# Patient Record
Sex: MALE | Race: WHITE | HISPANIC OR LATINO
[De-identification: names, ages, dates, MRNs, and addresses within clinical notes are randomized per-mention and may not be internally consistent; named-entity substitution may affect disease eponyms.]

---

## 2021-05-11 PROBLEM — Z00.00 ENCOUNTER FOR PREVENTIVE HEALTH EXAMINATION: Status: ACTIVE | Noted: 2021-05-11

## 2021-05-17 ENCOUNTER — APPOINTMENT (OUTPATIENT)
Dept: ORTHOPEDIC SURGERY | Facility: CLINIC | Age: 51
End: 2021-05-17
Payer: MEDICAID

## 2021-05-17 VITALS — TEMPERATURE: 98.1 F | WEIGHT: 256 LBS | BODY MASS INDEX: 35.84 KG/M2 | HEIGHT: 71 IN

## 2021-05-17 DIAGNOSIS — Z78.9 OTHER SPECIFIED HEALTH STATUS: ICD-10-CM

## 2021-05-17 DIAGNOSIS — Z86.79 PERSONAL HISTORY OF OTHER DISEASES OF THE CIRCULATORY SYSTEM: ICD-10-CM

## 2021-05-17 PROCEDURE — 73110 X-RAY EXAM OF WRIST: CPT | Mod: RT

## 2021-05-17 PROCEDURE — 99204 OFFICE O/P NEW MOD 45 MIN: CPT

## 2021-05-17 PROCEDURE — 73080 X-RAY EXAM OF ELBOW: CPT | Mod: LT

## 2021-05-17 PROCEDURE — 99072 ADDL SUPL MATRL&STAF TM PHE: CPT

## 2021-05-17 RX ORDER — HYDROCHLOROTHIAZIDE 25 MG/1
25 TABLET ORAL
Refills: 0 | Status: ACTIVE | COMMUNITY

## 2021-05-17 RX ORDER — AMLODIPINE BESYLATE 10 MG/1
10 TABLET ORAL
Refills: 0 | Status: ACTIVE | COMMUNITY

## 2021-05-17 RX ORDER — HYDROCHLOROTHIAZIDE 25 MG/1
25 TABLET ORAL
Qty: 30 | Refills: 0 | Status: ACTIVE | COMMUNITY
Start: 2021-04-21

## 2021-05-17 RX ORDER — CEPHALEXIN 500 MG/1
500 CAPSULE ORAL
Qty: 14 | Refills: 0 | Status: ACTIVE | COMMUNITY
Start: 2020-11-27

## 2021-05-17 RX ORDER — AMLODIPINE BESYLATE 10 MG/1
10 TABLET ORAL
Qty: 30 | Refills: 0 | Status: ACTIVE | COMMUNITY
Start: 2021-04-21
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
## 2021-05-17 NOTE — PHYSICAL EXAM
[de-identified] : -Constitutional: This is a male in not obvious distress.\par -Psych: Patient is alert and oriented to person, place and time. Patient has a normal mood and affect.\par -Cardiovascular: Normal pulses throughout the upper extremities. No significant varicosities are noted in the upper extremities.\par -Neuro: Strength and sensation are intact throughout the upper extremities. Patient has normal coordination.\par -Respiratory: Patient exhibits no evidence of shortness of breath or difficulty breathing.\par -Skin: No rashes, lesions, or other abnormalities are noted in the upper extremities.\par \par ---\par \par Examination of his right wrist demonstrates no obvious swelling.  Is tender dorsally over the scapholunate interval.  There are no palpable masses or ganglion cyst.  He has negative Campos sign.  He does have pain with terminal flexion and extension limitation.  He has full flexion and extension of the digits.  His complaints of numbness and tingling in the fingers.  Provocative signs for carpal tunnel syndrome are negative.  He has normal sensation to light touch distally along the radial, ulnar and median nerve distributions.\par \par Examination of his left elbow demonstrates mild thickening of the olecranon bursa with mild tenderness.  There is no obvious fluid.  There is no tenderness along the medial or lateral epicondyles.  He has full motion.  He is neurovascularly intact distally. [de-identified] : PA, lateral, PA  and PA ulnar deviation views of his right wrist demonstrate no obvious fractures or dislocations.  There is possibly minimal widening of the scapholunate interval on the PA  view.\par \par AP, lateral oblique radiographs of his left elbow demonstrate no obvious fractures.  There is a small olecranon spur.

## 2021-05-17 NOTE — DISCUSSION/SUMMARY
[FreeTextEntry1] : He has findings consistent with persistent right dorsal wrist pain, status post an injury 5 months ago.  He also has complaints consistent with possible right carpal tunnel syndrome.  Finally, he has mild left elbow pain consistent with olecranon bursitis with a small spur noted on radiographs.\par \par I had a discussion regarding today's visit, the prognosis of this diagnosis and treatment recommendations and options.\par \par With regard to his right wrist injury, I recommended an MRI to evaluate his right wrist injury and to rule out a scapholunate ligament injury.  I also ordered EMGs to better evaluate his numbness and tingling.  He will follow-up after the studies to review the results and discuss treatment recommendations.\par \par With regard to his left elbow, I recommended he avoid leaning on the elbow and symptomatic treatment was discussed.\par \par The patient has agreed to this plan of management and has expressed full understanding.  All questions were fully answered to the patient's satisfaction.\par \par My cumulative time spent on this patient's visit included: Preparation for the visit, review of the medical records, review of pertinent diagnostic studies, examination and counseling of the patient on the above diagnosis, treatment plan and prognosis, orders of diagnostic tests, medications and/ or appropriate procedures and documentation in the medical records of today's visit.

## 2021-05-17 NOTE — ADDENDUM
[FreeTextEntry1] : This note was written by Darya Lopez on 05/17/2021 acting solely as a scribe for Dr. Chepe Starr.\par \par All medical record entries made by the scribe were at my, Dr. Chepe Starr, direction and personally dictated by me on 05/17/2021. I have personally reviewed the chart and agree that the record accurately reflects my personal performance of the history, physical exam, assessment, and plan

## 2021-06-01 ENCOUNTER — APPOINTMENT (OUTPATIENT)
Dept: RADIOLOGY | Facility: CLINIC | Age: 51
End: 2021-06-01
Payer: MEDICAID

## 2021-06-01 ENCOUNTER — RESULT REVIEW (OUTPATIENT)
Age: 51
End: 2021-06-01

## 2021-06-01 ENCOUNTER — APPOINTMENT (OUTPATIENT)
Dept: MRI IMAGING | Facility: CLINIC | Age: 51
End: 2021-06-01

## 2021-06-01 DIAGNOSIS — Z18.10 RETAINED METAL FRAGMENTS, UNSPECIFIED: ICD-10-CM

## 2021-06-01 PROCEDURE — 71046 X-RAY EXAM CHEST 2 VIEWS: CPT

## 2021-06-01 PROCEDURE — 74019 RADEX ABDOMEN 2 VIEWS: CPT

## 2021-06-02 ENCOUNTER — NON-APPOINTMENT (OUTPATIENT)
Age: 51
End: 2021-06-02

## 2021-06-18 ENCOUNTER — NON-APPOINTMENT (OUTPATIENT)
Age: 51
End: 2021-06-18

## 2021-06-28 ENCOUNTER — APPOINTMENT (OUTPATIENT)
Dept: ORTHOPEDIC SURGERY | Facility: CLINIC | Age: 51
End: 2021-06-28
Payer: MEDICAID

## 2021-06-28 VITALS — BODY MASS INDEX: 35.84 KG/M2 | HEIGHT: 71 IN | TEMPERATURE: 97.6 F | WEIGHT: 256 LBS

## 2021-06-28 PROCEDURE — 99072 ADDL SUPL MATRL&STAF TM PHE: CPT

## 2021-06-28 PROCEDURE — 99214 OFFICE O/P EST MOD 30 MIN: CPT

## 2021-06-28 NOTE — PHYSICAL EXAM
[de-identified] : -Constitutional: This is a male in not obvious distress.\par -Psych: Patient is alert and oriented to person, place and time. Patient has a normal mood and affect.\par -Cardiovascular: Normal pulses throughout the upper extremities. No significant varicosities are noted in the upper extremities.\par -Neuro: Strength and sensation are intact throughout the upper extremities. Patient has normal coordination.\par -Respiratory: Patient exhibits no evidence of shortness of breath or difficulty breathing.\par -Skin: No rashes, lesions, or other abnormalities are noted in the upper extremities.\par \par ---\par \par Examination of his right wrist demonstrates no obvious swelling.  He remains tender dorsally over the scapholunate interval.  There are no palpable masses or ganglion cyst.  He has negative Campos sign.  He does have pain with terminal flexion and extension limitation.  He has full flexion and extension of the digits.  His complaints of numbness and tingling in the fingers.  Provocative signs for carpal tunnel syndrome are negative.  He has normal sensation to light touch distally along the radial, ulnar and median nerve distributions.\par \par Examination of his left elbow demonstrates mild thickening of the olecranon bursa with mild tenderness.  There is no obvious fluid.  There is no tenderness along the medial or lateral epicondyles.  He does have a positive Tinel along the ulnar nerve at the cubital tunnel.  He has full motion.  Provocative signs for carpal tunnel syndrome are negative.  He has normal sensation bilaterally along the radial, ulnar and median nerve distributions distally. [de-identified] : Previous PA, lateral, PA  and PA ulnar deviation views of his right wrist demonstrated no obvious fractures or dislocations.  There is possibly minimal widening of the scapholunate interval on the PA  view.\par \par Previous AP, lateral oblique radiographs of his left elbow demonstrated no obvious fractures.  There is a small olecranon spur.
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
## 2021-06-28 NOTE — ADDENDUM
[FreeTextEntry1] : This note was written by Darya Lopez on 06/28/2021 acting solely as a scribe for Dr. Chepe Starr.\par \par All medical record entries made by the scribe were at my, Dr. Chepe Starr, direction and personally dictated by me on 06/28/2021. I have personally reviewed the chart and agree that the record accurately reflects my personal performance of the history, physical exam, assessment, and plan

## 2021-07-19 ENCOUNTER — OUTPATIENT (OUTPATIENT)
Dept: OUTPATIENT SERVICES | Facility: HOSPITAL | Age: 51
LOS: 1 days | End: 2021-07-19
Payer: MEDICAID

## 2021-07-19 PROCEDURE — 93010 ELECTROCARDIOGRAM REPORT: CPT

## 2021-07-26 ENCOUNTER — OUTPATIENT (OUTPATIENT)
Dept: OUTPATIENT SERVICES | Facility: HOSPITAL | Age: 51
LOS: 1 days | End: 2021-07-26

## 2021-07-29 ENCOUNTER — APPOINTMENT (OUTPATIENT)
Dept: ORTHOPEDIC SURGERY | Facility: CLINIC | Age: 51
End: 2021-07-29
Payer: MEDICAID

## 2021-07-29 VITALS — WEIGHT: 256 LBS | TEMPERATURE: 98.2 F | BODY MASS INDEX: 35.84 KG/M2 | HEIGHT: 71 IN

## 2021-07-29 PROCEDURE — 99072 ADDL SUPL MATRL&STAF TM PHE: CPT

## 2021-07-29 PROCEDURE — 99214 OFFICE O/P EST MOD 30 MIN: CPT
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
## 2021-07-29 NOTE — DISCUSSION/SUMMARY
[FreeTextEntry1] :  I had a discussion regarding today's visit, the diagnosis and treatment recommendations and options.  We also discussed changes since the last visit.\par \par At this time, I have also ordered an MRI of his left elbow to better evaluate his increasing pain.  He will follow-up after the MRI of his left elbow and right wrist to discuss treatment recommendations.\par \par The patient has agreed to the above plan of management and has expressed full understanding.  All questions were fully answered to the patient's satisfaction.\par \par My cumulative time spent on today's visit was greater than 30 minutes and included: Preparation for the visit, review of the medical records, review of pertinent diagnostic studies, examination and counseling of the patient on the above diagnosis, treatment plan and prognosis, orders of diagnostic tests, medications and/or appropriate procedures and documentation in the medical records of today's visit.

## 2021-07-29 NOTE — PHYSICAL EXAM
[de-identified] : -Constitutional: This is a male in not obvious distress.\par -Psych: Patient is alert and oriented to person, place and time. Patient has a normal mood and affect.\par -Cardiovascular: Normal pulses throughout the upper extremities. No significant varicosities are noted in the upper extremities.\par -Neuro: Patient has normal coordination.\par -Respiratory: Patient exhibits no evidence of shortness of breath or difficulty breathing.\par -Skin: No rashes, lesions, or other abnormalities are noted in the upper extremities.\par \par ---\par \par Examination of his right wrist demonstrates no obvious swelling.  He remains tender dorsally over the scapholunate interval.  There are no palpable masses or ganglion cyst.  He has negative Campos sign.  He does have pain with terminal flexion and extension limitation.  He has full flexion and extension of the digits.  His complaints of numbness and tingling in the fingers.  Provocative signs for carpal tunnel syndrome are negative.  He has normal sensation to light touch distally along the radial, ulnar and median nerve distributions.\par \par Examination of his left elbow demonstrates mild thickening of the olecranon bursa with increased tenderness.  There is no obvious fluid.  There is no tenderness along the medial or lateral epicondyles.  He has a positive Tinel along the ulnar nerve at the cubital tunnel.  He has full motion.  Provocative signs for carpal tunnel syndrome are negative.  He was noted today to have decreased sensation to light touch along the ulnar nerve distribution with normal sensation median and radial nerve distributions. [de-identified] : Previous PA, lateral, PA  and PA ulnar deviation views of his right wrist demonstrated no obvious fractures or dislocations.  There is possibly minimal widening of the scapholunate interval on the PA  view.\par \par Previous AP, lateral oblique radiographs of his left elbow demonstrated no obvious fractures.  There is a small olecranon spur.

## 2021-08-11 ENCOUNTER — APPOINTMENT (OUTPATIENT)
Dept: MRI IMAGING | Facility: CLINIC | Age: 51
End: 2021-08-11
Payer: MEDICAID

## 2021-08-11 ENCOUNTER — RESULT REVIEW (OUTPATIENT)
Age: 51
End: 2021-08-11

## 2021-08-11 PROCEDURE — 73221 MRI JOINT UPR EXTREM W/O DYE: CPT | Mod: LT

## 2021-08-19 ENCOUNTER — APPOINTMENT (OUTPATIENT)
Dept: ORTHOPEDIC SURGERY | Facility: CLINIC | Age: 51
End: 2021-08-19
Payer: MEDICAID

## 2021-08-19 VITALS — BODY MASS INDEX: 35.84 KG/M2 | HEIGHT: 71 IN | WEIGHT: 256 LBS | TEMPERATURE: 97.5 F

## 2021-08-19 PROCEDURE — 99214 OFFICE O/P EST MOD 30 MIN: CPT

## 2021-08-19 NOTE — DISCUSSION/SUMMARY
[FreeTextEntry1] : I reviewed the MRI results with him.  I had a discussion regarding today's visit, the diagnosis and treatment recommendations and options.  We also discussed changes since the last visit.  Given his symptoms, he would like to go ahead and schedule surgery on the left elbow.  I have recommended left ulnar nerve release at the cubital tunnel as well as olecranon bursectomy, as he is bothered by the bursal sac when he leans on the elbow.  These can both be performed at the same time.  Although the EMGs demonstrated mild right carpal tunnel syndrome, he denies numbness or tingling along the median nerve distribution and provocative signs for carpal tunnel syndrome are negative.  I therefore did not recommend releasing the carpal tunnel at the same time.\par \par Finally, with regard to the right wrist, he is awaiting authorization of the MRI by his insurance company.\par \par -  The nature and purposes of cubital tunnel release surgery was discussed with the patient in detail.  I also discussed olecranon bursectomy.  I discussed that I will be performing an in-situ ulnar nerve release at the cubital tunnel.  We discussed the surgical procedure in detail, as well as expected postoperative recovery and outcome.  The patient understands that if the ulnar nerve is found to be unstable after in situ release, then an anterior subcutaneous transposition may be necessary.  The patient understands that if this is necessary, then the procedure will be more involved, there will be a larger incision, and the expected recovery time is prolonged.\par -  Possible risks, benefits and complications (from known and unknown causes) of the procedure were discussed in detail.  \par -  Possible non-operative alternatives to the proposed treatment were discussed in detail.  \par -  He was told that possible risks/complications include, but are not limited to:  Infection, nerve or vessel injury, possibly hematoma into the bursal sac which may need to be drained, stiffness, painful scar, poor outcome, need for additional surgical procedures, and other unforeseen complications.  \par -  In addition, the possibility of an "unsuccessful outcome," despite "successful surgery," was discussed with the patient.  The patient understands that nerve recovery after surgical release can be unpredictable, and there are no "guarantees" that the preoperative symptoms will completely resolve.  This is particular true in his case, as his EMGs demonstrated severe left cubital tunnel syndrome.\par -  The patient fully understands these risks and wishes to proceed.  \par -  I had a lengthy discussion with the patient regarding today's visit, the diagnosis, and my surgical treatment recommendations.  The patient has agreed to this plan of management and has expressed full understanding.  All questions were fully answered to the patient's satisfaction.\par \par My cumulative time spent on today's visit was greater than 30 minutes and included: Preparation for the visit, review of the medical records, review of pertinent diagnostic studies, examination and counseling of the patient on the above diagnosis, treatment plan and prognosis, orders of diagnostic tests, medications and/or appropriate procedures and documentation in the medical records of today's visit.

## 2021-08-19 NOTE — PHYSICAL EXAM
[de-identified] : -Constitutional: This is a male in not obvious distress.\par -Psych: Patient is alert and oriented to person, place and time. Patient has a normal mood and affect.\par -Cardiovascular: Normal pulses throughout the upper extremities. No significant varicosities are noted in the upper extremities.\par -Neuro: Patient has normal coordination.\par -Respiratory: Patient exhibits no evidence of shortness of breath or difficulty breathing.\par -Skin: No rashes, lesions, or other abnormalities are noted in the upper extremities.\par \par ---\par \par Examination of his right wrist demonstrates no obvious swelling.  He remains tender dorsally over the scapholunate interval.  There are no palpable masses or ganglion cyst.  He has negative Campos sign.  He does have pain with terminal flexion and extension limitation.  He has full flexion and extension of the digits.  His complaints of numbness and tingling in the fingers.  Provocative signs for carpal tunnel syndrome are negative.  He has normal sensation to light touch distally along the radial, ulnar and median nerve distributions.\par \par Examination of his left elbow demonstrates mild thickening of the olecranon bursa with associated tenderness.  There is no obvious fluid.  There is no tenderness along the medial or lateral epicondyles.  He has a positive Tinel along the ulnar nerve at the cubital tunnel.  He has full motion.  There is no instability of the ulnar nerve with flexion and extension of the elbow.  Provocative signs for carpal tunnel syndrome are negative.  He has decreased sensation to light touch along the ulnar nerve distribution with normal sensation median and radial nerve distributions. [de-identified] : I reviewed the MRI of his left elbow from 8/11/2021.  This demonstrated trace fluid in the region of the olecranon bursa.  No tendon or muscle tears.  Focal signal alteration in the ulnar nerve consistent with his cubital tunnel syndrome.\par \par Previous PA, lateral, PA  and PA ulnar deviation views of his right wrist demonstrated no obvious fractures or dislocations.  There is possibly minimal widening of the scapholunate interval on the PA  view.\par \par Previous AP, lateral oblique radiographs of his left elbow demonstrated no obvious fractures.  There is a small olecranon spur.

## 2021-08-19 NOTE — HISTORY OF PRESENT ILLNESS
[FreeTextEntry1] : Follow-up regarding right dorsal wrist pain, status post an injury 7 months ago and severe left cubital tunnel syndrome, mild bilateral carpal tunnel syndrome and mild left elbow pain consistent with olecranon bursitis with a small spur noted on radiographs.\par \par See note from when he was seen in the office 3 weeks ago.  I ordered an MRI of his left elbow given the severity of his symptoms to better evaluate his condition as well as an MRI of his right wrist.\par \par He returns today in follow-up.  He has complaints of pain at his elbow when leaning on it as well as numbness and tingling along the ulnar nerve distribution.\par \par He continues to have pain at his right wrist.  He did not get the MRI of his right wrist, as it has not yet been authorized by his insurance company.\par \par EMG results from 6/9/2021 demonstrated mild bilateral carpal tunnel syndrome and severe left cubital tunnel syndrome.

## 2021-09-02 ENCOUNTER — APPOINTMENT (OUTPATIENT)
Dept: ORTHOPEDIC SURGERY | Facility: CLINIC | Age: 51
End: 2021-09-02
Payer: MEDICAID

## 2021-09-02 VITALS — HEIGHT: 71 IN | BODY MASS INDEX: 35.84 KG/M2 | TEMPERATURE: 97.9 F | WEIGHT: 256 LBS

## 2021-09-02 PROCEDURE — 99214 OFFICE O/P EST MOD 30 MIN: CPT

## 2021-09-02 NOTE — HISTORY OF PRESENT ILLNESS
[FreeTextEntry1] : Follow-up regarding right dorsal wrist pain, status post an injury 7 1/2 months ago and severe left cubital tunnel syndrome, mild bilateral carpal tunnel syndrome and mild left elbow pain consistent with olecranon bursitis with a small spur noted on radiographs.\par \par See note from when he was seen in the office 2 weeks ago.  At that time he agreed to schedule left cubital tunnel release and left elbow olecranon bursectomy.  He is scheduled for surgery on October 4.\par \par He returns today with complaints of worsening right wrist pain. He also complains of numbness in the right index, middle, and ring fingers. He rates his pain a 6-7 out of 10 at this time.  Again, he did not get the MRI of his right wrist, as it has not yet been authorized by his insurance company.\par \par EMG results from 6/9/2021 demonstrated mild bilateral carpal tunnel syndrome and severe left cubital tunnel syndrome.

## 2021-09-02 NOTE — PHYSICAL EXAM
[de-identified] : -Constitutional: This is a male in not obvious distress.\par -Psych: Patient is alert and oriented to person, place and time. Patient has a normal mood and affect.\par -Cardiovascular: Normal pulses throughout the upper extremities. No significant varicosities are noted in the upper extremities.\par -Neuro: Patient has normal coordination.\par -Respiratory: Patient exhibits no evidence of shortness of breath or difficulty breathing.\par -Skin: No rashes, lesions, or other abnormalities are noted in the upper extremities.\par \par ---\par \par Examination of his right wrist demonstrates mild swelling along the dorsal wrist capsule.  He remains tender dorsally over the scapholunate interval.  There are no palpable masses or ganglion cyst.  He has negative Campos sign.  He does have pain with terminal flexion and extension limitation.  He has full flexion and extension of the digits.  His complaints of numbness and tingling along the median nerve distribution.  Provocative signs for carpal tunnel syndrome are positive.  He has normal sensation to light touch distally along the radial, ulnar and median nerve distributions.\par \par Examination of his left elbow demonstrates mild thickening of the olecranon bursa with associated tenderness.  There is no obvious fluid.  There is no tenderness along the medial or lateral epicondyles.  He has a positive Tinel along the ulnar nerve at the cubital tunnel.  He has full motion.  There is no instability of the ulnar nerve with flexion and extension of the elbow.  Provocative signs for carpal tunnel syndrome are negative.  He has decreased sensation to light touch along the ulnar nerve distribution with normal sensation median and radial nerve distributions. [de-identified] : An  MRI of his left elbow from 8/11/2021 demonstrated trace fluid in the region of the olecranon bursa.  No tendon or muscle tears.  Focal signal alteration in the ulnar nerve consistent with his cubital tunnel syndrome.\par \par Previous PA, lateral, PA  and PA ulnar deviation views of his right wrist demonstrated no obvious fractures or dislocations.  There is possibly minimal widening of the scapholunate interval on the PA  view.\par \par Previous AP, lateral oblique radiographs of his left elbow demonstrated no obvious fractures.  There is a small olecranon spur.

## 2021-09-02 NOTE — DISCUSSION/SUMMARY
[FreeTextEntry1] : I had a discussion regarding today's visit, the diagnosis and treatment recommendations and options.  We also discussed changes since the last visit.  \par \par With regard to his left upper extremity, again he is scheduled for surgery on October 4.  I previously discussed the procedures and expected recovery.\par \par With regard to his right wrist and hand, I do believe that he requires an MRI of the right wrist to better evaluate his symptoms.  I will have my  speak to the insurance company to see if we can set up a peer to peer.  He also has symptoms consistent with right carpal tunnel syndrome.  I recommended a carpal tunnel splint.  I will speak to him after he has gotten the MRI of the right wrist.  I told him that I cannot give him a cortisone injection on the right side, as he is having surgery on the left side in 1 month.\par \par The patient has agreed to the above plan of management and has expressed full understanding.  All questions were fully answered to the patient's satisfaction.\par \par My cumulative time spent on today's visit was greater than 30 minutes and included: Preparation for the visit, review of the medical records, review of pertinent diagnostic studies, examination and counseling of the patient on the above diagnosis, treatment plan and prognosis, orders of diagnostic tests, medications and/or appropriate procedures and documentation in the medical records of today's visit.

## 2021-09-16 ENCOUNTER — APPOINTMENT (OUTPATIENT)
Dept: MRI IMAGING | Facility: CLINIC | Age: 51
End: 2021-09-16
Payer: MEDICAID

## 2021-09-16 ENCOUNTER — RESULT REVIEW (OUTPATIENT)
Age: 51
End: 2021-09-16

## 2021-09-16 PROCEDURE — 73221 MRI JOINT UPR EXTREM W/O DYE: CPT | Mod: RT

## 2021-09-20 ENCOUNTER — OUTPATIENT (OUTPATIENT)
Dept: OUTPATIENT SERVICES | Facility: HOSPITAL | Age: 51
LOS: 1 days | End: 2021-09-20
Payer: MEDICAID

## 2021-09-20 PROCEDURE — 93010 ELECTROCARDIOGRAM REPORT: CPT

## 2021-09-22 PROBLEM — G56.01 CARPAL TUNNEL SYNDROME OF RIGHT WRIST: Status: ACTIVE | Noted: 2021-05-17

## 2021-09-22 PROBLEM — S63.501A RIGHT WRIST SPRAIN: Status: ACTIVE | Noted: 2021-05-17

## 2021-09-27 ENCOUNTER — APPOINTMENT (OUTPATIENT)
Dept: ORTHOPEDIC SURGERY | Facility: CLINIC | Age: 51
End: 2021-09-27
Payer: MEDICAID

## 2021-09-27 VITALS — WEIGHT: 256 LBS | TEMPERATURE: 97.5 F | BODY MASS INDEX: 35.84 KG/M2 | HEIGHT: 71 IN

## 2021-09-27 DIAGNOSIS — S63.501A UNSPECIFIED SPRAIN OF RIGHT WRIST, INITIAL ENCOUNTER: ICD-10-CM

## 2021-09-27 DIAGNOSIS — G56.01 CARPAL TUNNEL SYNDROME, RIGHT UPPER LIMB: ICD-10-CM

## 2021-09-27 PROCEDURE — 99214 OFFICE O/P EST MOD 30 MIN: CPT

## 2021-09-27 NOTE — HISTORY OF PRESENT ILLNESS
[FreeTextEntry1] : Follow-up regarding right dorsal wrist pain, status post an injury 8 1/2 months ago and severe left cubital tunnel syndrome, mild bilateral carpal tunnel syndrome and mild left elbow pain consistent with olecranon bursitis with a small spur noted on radiographs.\par \par See note from when he was seen in the office 25 ago.  I reordered the MRI of his right wrist.  He comes in to review results and discuss treatment recommendations.  \par \par As previously noted, he is scheduled left cubital tunnel release and left elbow olecranon bursectomy on October 4.\par \par He returns today with complaints of worsening right wrist pain. He also complains of numbness in the right index, middle, and ring fingers. He rates his pain a 6-7 out of 10 at this time.  Again, he did not get the MRI of his right wrist, as it has not yet been authorized by his insurance company.\par \par EMG results from 6/9/2021 demonstrated mild bilateral carpal tunnel syndrome and severe left cubital tunnel syndrome.

## 2021-09-27 NOTE — PHYSICAL EXAM
[de-identified] : -Constitutional: This is a male in not obvious distress.\par -Psych: Patient is alert and oriented to person, place and time. Patient has a normal mood and affect.\par -Cardiovascular: Normal pulses throughout the upper extremities. No significant varicosities are noted in the upper extremities.\par -Neuro: Patient has normal coordination.\par -Respiratory: Patient exhibits no evidence of shortness of breath or difficulty breathing.\par -Skin: No rashes, lesions, or other abnormalities are noted in the upper extremities.\par \par ---\par \par Examination of his right wrist demonstrates mild swelling along the dorsal wrist capsule.  He remains tender dorsally over the scapholunate interval.  There are no palpable masses or ganglion cyst.  He has negative Campos sign.  He does have pain with terminal flexion and extension limitation.  There is no localized swelling or tenderness along the TFCC ligament ulnocarpal joint.  He has full flexion and extension of the digits.  His complaints of numbness and tingling along the median nerve distribution.  Provocative signs for carpal tunnel syndrome are positive.  He has normal sensation to light touch distally along the radial, ulnar and median nerve distributions.\par \par Examination of his left elbow demonstrates mild thickening of the olecranon bursa with associated tenderness.  There is no obvious fluid.  There is no tenderness along the medial or lateral epicondyles.  He has a positive Tinel along the ulnar nerve at the cubital tunnel.  He has full motion.  There is no instability of the ulnar nerve with flexion and extension of the elbow.  Provocative signs for carpal tunnel syndrome are negative.  He has decreased sensation to light touch along the ulnar nerve distribution with normal sensation median and radial nerve distributions. [de-identified] : I reviewed the MRI of his right wrist from 9/16/2021.  This demonstrated a sprain of the dorsal and proximal portions of the scapholunate ligament, a sprain of the palmar radiocarpal ligament, a full-thickness tear of the central portion of the TFCC articular disc and STT joint arthritis.  No evidence of a ganglion cyst.\par \par An  MRI of his left elbow from 8/11/2021 demonstrated trace fluid in the region of the olecranon bursa.  No tendon or muscle tears.  Focal signal alteration in the ulnar nerve consistent with his cubital tunnel syndrome.\par \par Previous PA, lateral, PA  and PA ulnar deviation views of his right wrist demonstrated no obvious fractures or dislocations.  There is possibly minimal widening of the scapholunate interval on the PA  view.\par \par Previous AP, lateral oblique radiographs of his left elbow demonstrated no obvious fractures.  There is a small olecranon spur.

## 2021-09-27 NOTE — DISCUSSION/SUMMARY
[FreeTextEntry1] : I had a discussion regarding today's visit, the diagnosis and treatment recommendations and options.  We also discussed changes since the last visit.  \par \par With regard to his right wrist and hand, I reviewed the MRI results with him.  At this time, I told him that I would recommend initially trying a right wrist radiocarpal joint cortisone injection.  He understands that if this did not provide him with relief, then he may require arthroscopic surgery.  However, because he is undergoing his left elbow surgery next week, I would not recommend injection at this time and I would not recommend likely waiting approximately 1 month after the surgery to prevent risks of infection.  He agreed with this plan.\par \par With regard to his left upper extremity, again he is scheduled for surgery on October 4.  I previously discussed the procedures and expected recovery.\par \par The patient has agreed to the above plan of management and has expressed full understanding.  All questions were fully answered to the patient's satisfaction.\par \par My cumulative time spent on today's visit was greater than 30 minutes and included: Preparation for the visit, review of the medical records, review of pertinent diagnostic studies, examination and counseling of the patient on the above diagnosis, treatment plan and prognosis, orders of diagnostic tests, medications and/or appropriate procedures and documentation in the medical records of today's visit.

## 2021-09-27 NOTE — ADDENDUM
[FreeTextEntry1] : I, Denny Peguero, acted solely as a scribe for Dr. Starr on this date on 09/27/2021.

## 2021-09-27 NOTE — END OF VISIT
[FreeTextEntry3] : This note was written by Denny Peguero on 09/27/2021 acting solely as a scribe for Dr. Chepe Starr.\par  \par All medical record entries made by the Scribe were at my, Dr. Chepe Starr, direction and personally dictated by me on 09/27/2021. I have personally reviewed the chart and agree that the record accurately reflects my personal performance of the history, physical exam.

## 2021-10-01 ENCOUNTER — NON-APPOINTMENT (OUTPATIENT)
Age: 51
End: 2021-10-01

## 2021-10-01 ENCOUNTER — APPOINTMENT (OUTPATIENT)
Dept: DISASTER EMERGENCY | Facility: CLINIC | Age: 51
End: 2021-10-01

## 2021-10-01 DIAGNOSIS — Z01.818 ENCOUNTER FOR OTHER PREPROCEDURAL EXAMINATION: ICD-10-CM

## 2021-10-02 LAB — SARS-COV-2 N GENE NPH QL NAA+PROBE: NOT DETECTED

## 2021-10-04 ENCOUNTER — APPOINTMENT (OUTPATIENT)
Dept: ORTHOPEDIC SURGERY | Facility: HOSPITAL | Age: 51
End: 2021-10-04

## 2021-10-04 ENCOUNTER — OUTPATIENT (OUTPATIENT)
Dept: OUTPATIENT SERVICES | Facility: HOSPITAL | Age: 51
LOS: 1 days | End: 2021-10-04
Payer: MEDICAID

## 2021-10-04 PROCEDURE — 64718 REVISE ULNAR NERVE AT ELBOW: CPT | Mod: 59,LT

## 2021-10-04 PROCEDURE — 24105 EXCISION OLECRANON BURSA: CPT | Mod: LT,59

## 2021-10-11 ENCOUNTER — APPOINTMENT (OUTPATIENT)
Dept: ORTHOPEDIC SURGERY | Facility: CLINIC | Age: 51
End: 2021-10-11
Payer: MEDICAID

## 2021-10-11 VITALS — HEIGHT: 71 IN | BODY MASS INDEX: 35.84 KG/M2 | WEIGHT: 256 LBS | TEMPERATURE: 97.2 F

## 2021-10-11 PROCEDURE — 99024 POSTOP FOLLOW-UP VISIT: CPT

## 2021-10-11 NOTE — ADDENDUM
[FreeTextEntry1] : I, Denny Peguero, acted solely as a scribe for Dr. Starr on this date on 10/11/2021.

## 2021-10-11 NOTE — HISTORY OF PRESENT ILLNESS
[de-identified] : 1 week postoperative. [de-identified] : 1 week status post left ulnar nerve in situ release at the cubital tunnel and olecranon bursectomy.\par \par He is doing well overall and is improved. He denies pain. He states that his elbow does not fall asleep anymore. [de-identified] : Examination of his left elbow after the splint and dressings were removed demonstrates his incision to be clean and dry.  There is mild swelling.  There is no drainage or evidence of infection.  There is no instability of the ulnar nerve with flexion and extension.  He has normal sensation along the medial antebrachial cutaneous nerve branch as well as normal sensation distally in the hand along the radial, ulnar and median nerve distributions. [de-identified] : Stable, 1 week postoperative. [de-identified] : The suture ends were cut.  An Ace bandage was applied.  He was instructed on local wound care and activity modification.  He will follow-up in 2 weeks to assess his progress.

## 2021-10-11 NOTE — END OF VISIT
[FreeTextEntry3] : This note was written by Denny Peguero on 10/11/2021 acting solely as a scribe for Dr. Chepe Starr.\par  \par All medical record entries made by the Scribe were at my, Dr. Chepe Starr, direction and personally dictated by me on 10/11/2021. I have personally reviewed the chart and agree that the record accurately reflects my personal performance of the history, physical exam.

## 2021-10-25 PROBLEM — S69.81XA INJURY OF TRIANGULAR FIBROCARTILAGE COMPLEX (TFCC) OF RIGHT WRIST: Status: ACTIVE | Noted: 2021-09-22

## 2021-10-28 ENCOUNTER — APPOINTMENT (OUTPATIENT)
Dept: ORTHOPEDIC SURGERY | Facility: CLINIC | Age: 51
End: 2021-10-28
Payer: MEDICAID

## 2021-10-28 VITALS — BODY MASS INDEX: 35.84 KG/M2 | HEIGHT: 71 IN | WEIGHT: 256 LBS | TEMPERATURE: 98.2 F

## 2021-10-28 DIAGNOSIS — S69.81XA OTHER SPECIFIED INJURIES OF RIGHT WRIST, HAND AND FINGER(S), INITIAL ENCOUNTER: ICD-10-CM

## 2021-10-28 PROCEDURE — 99024 POSTOP FOLLOW-UP VISIT: CPT

## 2021-10-28 PROCEDURE — 20605 DRAIN/INJ JOINT/BURSA W/O US: CPT | Mod: 79,RT

## 2021-10-28 RX ADMIN — Medication MG/ML: at 00:00

## 2021-10-28 RX ADMIN — Medication %: at 00:00

## 2021-10-28 NOTE — END OF VISIT
[FreeTextEntry3] : This note was written by Denny Peguero on 10/28/2021 acting solely as a scribe for Dr. Chepe Starr.\par  \par All medical record entries made by the Scribe were at my, Dr. Chepe Starr, direction and personally dictated by me on 10/28/2021. I have personally reviewed the chart and agree that the record accurately reflects my personal performance of the history, physical exam.

## 2021-10-28 NOTE — PROCEDURE
[de-identified] : -  After a discussion of risks and benefits, the patient agreed to proceed with a cortisone injection.  \par -  Side: Right radio-carpal joint.\par -  Medications injected: 2 cc of 1% Lidocaine and 1 cc of 40mg of Depomedrol, using sterile technique.\par -  Patient tolerated the procedure well, without complications.\par -  Patient noted immediate improvement of the symptoms, secondary to the anesthetic effects of the injection.\par -  Patient was told that the pain may worsen for a day or 2, and should then begin to improve.  \par -  Instructions: Patient was instructed on the use of a wrist splint, ice, anti-inflammatory agents or Tylenol, and activity modification. \par -  Follow-up: Within 4 weeks to assess response to the injection.

## 2021-10-28 NOTE — HISTORY OF PRESENT ILLNESS
[de-identified] : 24 days postoperative. [de-identified] : 24 days status post left ulnar nerve in situ release at the cubital tunnel and olecranon bursectomy.\par \par He is doing well overall and is improved. He denies pain. He states that his elbow does not fall asleep anymore.\par \par With regard to his cubital tunnel, he notes pain with pressure as well as tender to the touch. \par \par With regard to his right wrist, He notes persistent pain. [de-identified] : Examination of his left elbow demonstrates his incision to be healing well.  There is decreased swelling.  There is no instability of the ulnar nerve with flexion and extension.  He has some tenderness along the olecranon bursa without obvious swelling.  He has normal sensation along the medial antebrachial cutaneous nerve branch as well as normal sensation distally in the hand along the radial, ulnar and median nerve distributions. [de-identified] : Stable, 24 days postoperative.  He does have some residual pain in the region of the bursal sac.\par \par He also has persistent pain at his right wrist.  See prior notes in this regard. [de-identified] : He was instructed on gentle range of motion exercises and scar massage and desensitization.  Avoid heavy lifting or grasping.  He will follow-up in 4 weeks.\par \par With regard to his right wrist, given his persistent symptoms, he would like to try a cortisone injection into the radiocarpal joint, knowing that this may not ultimately provide him with long-term relief.

## 2021-11-22 ENCOUNTER — NON-APPOINTMENT (OUTPATIENT)
Age: 51
End: 2021-11-22

## 2021-11-25 PROBLEM — S69.91XA: Status: ACTIVE | Noted: 2021-09-22

## 2021-11-25 PROBLEM — M70.22 OLECRANON BURSITIS OF LEFT ELBOW: Status: ACTIVE | Noted: 2021-05-17

## 2021-11-25 PROBLEM — G56.22 CUBITAL TUNNEL SYNDROME, LEFT: Status: ACTIVE | Noted: 2021-06-28

## 2021-12-02 ENCOUNTER — APPOINTMENT (OUTPATIENT)
Dept: ORTHOPEDIC SURGERY | Facility: CLINIC | Age: 51
End: 2021-12-02
Payer: MEDICAID

## 2021-12-02 VITALS — BODY MASS INDEX: 35.84 KG/M2 | WEIGHT: 256 LBS | TEMPERATURE: 97.5 F | HEIGHT: 71 IN

## 2021-12-02 DIAGNOSIS — G56.22 LESION OF ULNAR NERVE, LEFT UPPER LIMB: ICD-10-CM

## 2021-12-02 DIAGNOSIS — M70.22 OLECRANON BURSITIS, LEFT ELBOW: ICD-10-CM

## 2021-12-02 DIAGNOSIS — S69.91XA UNSPECIFIED INJURY OF RIGHT WRIST, HAND AND FINGER(S), INITIAL ENCOUNTER: ICD-10-CM

## 2021-12-02 PROCEDURE — 99024 POSTOP FOLLOW-UP VISIT: CPT

## 2021-12-02 NOTE — ADDENDUM
[FreeTextEntry1] : I, Denny Peguero, acted solely as a scribe for Dr. Starr on this date on 12/02/2021.

## 2021-12-02 NOTE — END OF VISIT
[FreeTextEntry3] : This note was written by Denny Peguero on 12/02/2021 acting solely as a scribe for Dr. Chepe Starr.\par  \par All medical record entries made by the Scribe were at my, Dr. Chepe Starr, direction and personally dictated by me on 12/02/2021. I have personally reviewed the chart and agree that the record accurately reflects my personal performance of the history, physical exam.

## 2021-12-02 NOTE — HISTORY OF PRESENT ILLNESS
[de-identified] : 2 months postoperative. [de-identified] : 2 months status post left ulnar nerve in situ release at the cubital tunnel and olecranon bursectomy.  Date of surgery: 10/4/2021\par \par When he was last seen in the office 5 weeks ago, he was given a cortisone injection at his right wrist radiocarpal joint.\par \par He denies pain in the right wrist and left elbow. He is doing quite well and his symptoms at the left elbow and ulnar nerve have resolved and he no longer has pain at his right wrist since the injection. [de-identified] : Examination of his left elbow demonstrates his incision to be healing well.  There is decreased swelling.  There is no instability of the ulnar nerve with flexion and extension. He no longer has tenderness along the olecranon bursa. He has normal sensation along the medial antebrachial cutaneous nerve branch as well as normal sensation distally in the hand along the radial, ulnar and median nerve distributions.\par \par Examination of his right wrist demonstrates no further tenderness along the dorsal wrist capsule. He has full motion. [de-identified] : Stable, 2 months postoperative status post left in situ ulnar nerve release at the cubital tunnel and olecranon bursectomy.\par \par He is also 5 weeks status post right wrist radiocarpal joint cortisone injection #1, [de-identified] : With regard to the left elbow, he was instructed on continued range of motion exercises and scar massage and desensitization.\par \par With regard to his right wrist, I recommend observation as he denies pain in the region. He does understand that his symptoms may recur in the future, and if they do, then he will return to the office.

## 2023-02-24 NOTE — REASON FOR VISIT
Currently very mild we will observe [Follow-Up Visit] : a follow-up visit for [FreeTextEntry2] : right wrist and left elbow